# Patient Record
Sex: MALE | Race: WHITE | URBAN - METROPOLITAN AREA
[De-identification: names, ages, dates, MRNs, and addresses within clinical notes are randomized per-mention and may not be internally consistent; named-entity substitution may affect disease eponyms.]

---

## 2018-06-29 ENCOUNTER — HISTORICAL (OUTPATIENT)
Dept: RADIOLOGY | Facility: HOSPITAL | Age: 28
End: 2018-06-29

## 2018-07-12 ENCOUNTER — HISTORICAL (OUTPATIENT)
Dept: RADIOLOGY | Facility: HOSPITAL | Age: 28
End: 2018-07-12

## 2018-11-13 LAB
ABS NEUT (OLG): 2.6 X10(3)/MCL (ref 1.5–6.9)
ALBUMIN SERPL-MCNC: 4 GM/DL (ref 3.4–5)
ALBUMIN/GLOB SERPL: 1.1 RATIO
ALP SERPL-CCNC: 40 UNIT/L (ref 30–113)
ALT SERPL-CCNC: 24 UNIT/L (ref 10–45)
APTT PPP: 27.2 SECOND(S) (ref 25–35)
AST SERPL-CCNC: 17 UNIT/L (ref 15–37)
BILIRUB SERPL-MCNC: 0.6 MG/DL (ref 0.1–0.9)
BILIRUBIN DIRECT+TOT PNL SERPL-MCNC: 0.1 MG/DL (ref 0–0.3)
BILIRUBIN DIRECT+TOT PNL SERPL-MCNC: 0.5 MG/DL
BUN SERPL-MCNC: 16 MG/DL (ref 10–20)
CALCIUM SERPL-MCNC: 9.3 MG/DL (ref 8–10.5)
CHLORIDE SERPL-SCNC: 103 MMOL/L (ref 100–108)
CO2 SERPL-SCNC: 29 MMOL/L (ref 21–35)
CREAT SERPL-MCNC: 0.83 MG/DL (ref 0.7–1.3)
ERYTHROCYTE [DISTWIDTH] IN BLOOD BY AUTOMATED COUNT: 11.8 % (ref 11.5–17)
GLOBULIN SER-MCNC: 3.8 GM/DL
GLUCOSE SERPL-MCNC: 95 MG/DL (ref 75–116)
HCT VFR BLD AUTO: 43.3 % (ref 42–52)
HGB BLD-MCNC: 14.5 GM/DL (ref 14–18)
INR PPP: 1 (ref 0–1.2)
MCH RBC QN AUTO: 29 PG (ref 27–34)
MCHC RBC AUTO-ENTMCNC: 34 GM/DL (ref 31–36)
MCV RBC AUTO: 88 FL (ref 80–99)
PLATELET # BLD AUTO: 201 X10(3)/MCL (ref 140–400)
PMV BLD AUTO: 10 FL (ref 6.8–10)
POTASSIUM SERPL-SCNC: 3.9 MMOL/L (ref 3.6–5.2)
PROT SERPL-MCNC: 7.8 GM/DL (ref 6.4–8.2)
PROTHROMBIN TIME: 10.7 SECOND(S) (ref 9–12)
RBC # BLD AUTO: 4.93 X10(6)/MCL (ref 4.7–6.1)
SODIUM SERPL-SCNC: 140 MMOL/L (ref 135–145)
WBC # SPEC AUTO: 5.6 X10(3)/MCL (ref 4.5–11.5)

## 2018-11-16 ENCOUNTER — HISTORICAL (OUTPATIENT)
Dept: ANESTHESIOLOGY | Facility: HOSPITAL | Age: 28
End: 2018-11-16

## 2022-04-10 ENCOUNTER — HISTORICAL (OUTPATIENT)
Dept: ADMINISTRATIVE | Facility: HOSPITAL | Age: 32
End: 2022-04-10

## 2022-04-29 VITALS
OXYGEN SATURATION: 100 % | SYSTOLIC BLOOD PRESSURE: 123 MMHG | BODY MASS INDEX: 18.34 KG/M2 | HEIGHT: 72 IN | DIASTOLIC BLOOD PRESSURE: 77 MMHG | WEIGHT: 135.38 LBS

## 2022-04-30 NOTE — OP NOTE
Patient:   Ashok Alonzo             MRN: [id number]            FIN: [id number]               Age:   28 years     Sex:  Male     :  1990   Associated Diagnoses:   Hypertrophy of inferior nasal turbinate; Nasal obstruction; Nasal septal deviation   Author:   Shailesh Mims MD      Operative Note   Operative Information   Date/ Time:  2018 20:26:00.     Procedures Performed: Procedure Code   Septoplasty or submucous resection, with or without cartilage scoring, contouring or replacement with graft (CPT4 81580) performed by Shailesh Mims MD on 2018 at 28 Years.  Associated diagnoses are Nasal septal deviation, and Nasal obstruction.  Bilateral inferior turbinectomy (CPT4 62873) performed by Shailesh Mims MD on 2018 at 28 Years..     Indications: Nasal obstruction secondary to bilateral inferior turbinate hypertrophy and bilateral septal deviation..     Preoperative Diagnosis: Hypertrophy of inferior nasal turbinate (QCT77-ZL J34.3), Nasal obstruction (AXY90-HI J34.89), Nasal septal deviation (CDT81-EJ J34.2).     Postoperative Diagnosis: Hypertrophy of inferior nasal turbinate (YPV30-ED J34.3), Nasal obstruction (GOF76-GL J34.89), Nasal septal deviation (BWN94-HS J34.2).     Surgeon: Shailesh Mims MD.     Anesthesia: General endotracheal anesthesia.     Speciman Removed: Nasal septal cartilage and bone.  Right and left inferior turbinates..     Description of Procedure/Findings/    Complications: The patient was brought to the operating room and placed on the operating room table in supine position after which general endotracheal anesthesia was induced.  The nasal septum was then infiltrated with 2% Xylocaine with 100,000 epinephrine for hemostasis.  Both inferior turbinates were also infiltrated.  Surgical patties saturated with 4% cocaine were then placed intranasally bilaterally also for hemostasis.  The patient was then prepped and draped.  5 minutes were allowed  to elapse.  Examination of the nose with the nasal speculum showed the above-mentioned findings.  Surgical patties had been removed from the nose.  The septoplasty was addressed 1st.  A hemitransfixion incision was made on the left and a mucoperichondrial and periosteal flap was then elevated on that side.  An incision was then made in the septal cartilage anterior to the deviation.  A mucoperichondrial and periosteal flap was then elevated on the opposite side.  A Gisell swivel knife was then used to resect the deviated portions of the septal cartilage.  There was spurring noted along the maxillary crest which contributed to obstruction.  Further elevation of the mucoperiosteum was carried out over the maxillary crest and the maxillary crest was then resected with an osteotome.  Next the remaining attachments of the septal cartilage to the perpendicular plate of the ethmoid and vomer were divided using the West elevator.  The deviated portions of the perpendicular plate of the ethmoid and vomer were then removed using a combination of duckbill forceps and punch forceps.  Care was taken to leave an adequate amount of cartilage for a caudal and dorsal strut.  The nose was then reexamined and found to be free of the obstruction from the the deviated septum.  The hemitransfixion incision was then closed using interrupted 5-0 chromic catgut.  The septal flaps were then reapproximated using a running 4-0 plain gut horizontal mattress suture.    Next attention was turned to the turbinates.  Both inferior turbinates were treated in a similar manner.  They were 1st medialized by infracturing using blunt elevator.  A straight hemostat was then placed across the transition zone between the vertical and horizontal portions of the inferior turbinates.  This was done to help with hemostasis.  The hemostat was left in place for approximately 1 minute.  After the hemostat was removed the turbinectomy was carried out by  incising across the transition zone with scissors.  This started anteriorly and extended on posteriorly.  A 0° 4 mm endoscope was used to help with visualization and completion of the turbinectomy posteriorly.  Hemostasis was then obtained along the cut edge of the inferior turbinates using suction cautery.  Blood was then evacuated from the nasal cavity and nasopharynx.    Next magnetic septal splints were placed and secured across columella.  Blood was evacuated from the nasopharynx and nasal cavity.  A drip pad was placed and the procedure terminated.  The patient was then awoken and brought to the recovery room in stable condition.  Patient tolerated the procedure well.      .     Esimated blood loss: loss less than  25  cc.     Findings: Patient had sigmoid deviation of the nasal septum with obstruction of the right and left nasal cavities.  He also had bilateral inferior turbinate hypertrophy which contributed to the nasal obstruction..     Complications: None.

## 2022-04-30 NOTE — H&P
"          CHIEF COMPLAINT:  Chronic sinusitis.    HISTORY OF PRESENT ILLNESS:  This patient is a 28-year-old male who has a history of chronic nasal congestion associated with postnasal drainage and frequent blowing of the nose for several years, dating back to childhood.  This does tend to be worse in the spring and when exposed to freshly cut grass.  Periodically, he will have some paroxysms of sneezing with these symptoms, but this does not seem to be a problem.  Over the years, he has taken various antihistamines as well as fluticasone nasal spray.  They did not find that any of these provided any significant relief.  In regard to sinusitis, he believes he requires treatment 3 times per year.  I had placed the patient on Astelin nasal spray, which would provide only partial relief of his symptoms for a few hours, but he did not feel that it gave him enough relief throughout the day and night.  Saline nasal spray was of no benefit.  As part of his evaluation, he did have a CT scan of the sinuses done earlier this summer, and this did show the presence of left septal deviation with obstruction of the nasal cavity and a right posterior septal spur and bilateral inferior turbinate hypertrophy.  There was no evidence of sinusitis.  The patient is being admitted to the hospital at this time for septoplasty with bilateral inferior turbinectomy.    PAST MEDICAL HISTORY:  Unremarkable for chronic medical illnesses except for seasonal allergy.    PAST SURGICAL HISTORY:  Status post hiatal hernia repair.  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  He also is status post removal of "estrogen" from the chest in 2013.    CURRENT MEDICATIONS:  Allegra p.r.n., Astelin nasal spray 2 puffs each nostril b.i.d. p.r.n., Prilosec 40 mg p.o. daily p.r.n.    ALLERGIES:  NO KNOWN DRUG ALLERGIES.      REVIEW OF SYSTEMS:  Unremarkable except as mentioned above.    SOCIAL HISTORY:  The patient has " never used tobacco.  He drinks alcohol socially.  No illicit drug use.  He is employed as a teacher and is .    FAMILY HISTORY:  Unremarkable for bleeding diathesis or anesthetic complications.  There is a family history of hypertension.    PHYSICAL EXAM:  VITAL SIGNS:  Weight 138 pounds.  Temperature 97.8 degrees Fahrenheit.  Pulse 65, blood pressure 112/60.   GENERAL:  Well-developed, well-nourished male in no acute distress.     HEAD AND FACE:  Normocephalic.  No facial lesions.     EARS:  External auditory canals are clear.  Tympanic membranes nonerythematous.  No middle ear effusions.     NOSE:  Nasal dorsum is unremarkable.  He has left septal deviation with obstruction and bilateral inferior turbinate hypertrophy and congestion.  Secretions are mucoid and clear.  Oral cavity and oropharynx nonerythematous without lesions.     NECK:  Supple without adenopathy or thyromegaly.  Trachea is in the midline.  Parotid and submandibular glands are normal to palpation.     CHEST:  Clear.     CARDIOVASCULAR:  Regular rate and rhythm without murmurs.   ABDOMEN:  Nondistended.     EXTREMITIES:  No cyanosis, clubbing, or edema.     NEUROLOGIC:  Alert.  Cranial nerves II through XII are grossly normal.    IMPRESSION:  Nasal obstruction secondary to septal deviation and bilateral inferior turbinate hypertrophy with chronic rhinitis.    PLAN:  Patient is scheduled for septoplasty and bilateral inferior turbinectomy on November 6, 2018.        JARETH/BRY   DD: 11/15/2018 2120   DT: 11/15/2018 2159  Job # [id number]